# Patient Record
Sex: MALE | Race: WHITE
[De-identification: names, ages, dates, MRNs, and addresses within clinical notes are randomized per-mention and may not be internally consistent; named-entity substitution may affect disease eponyms.]

---

## 2017-04-22 ENCOUNTER — HOSPITAL ENCOUNTER (EMERGENCY)
Dept: HOSPITAL 17 - PHED | Age: 67
Discharge: HOME | End: 2017-04-22
Payer: MEDICARE

## 2017-04-22 VITALS — RESPIRATION RATE: 18 BRPM | OXYGEN SATURATION: 95 %

## 2017-04-22 VITALS — SYSTOLIC BLOOD PRESSURE: 138 MMHG | DIASTOLIC BLOOD PRESSURE: 64 MMHG

## 2017-04-22 VITALS — BODY MASS INDEX: 34.61 KG/M2 | WEIGHT: 233.69 LBS | HEIGHT: 69 IN

## 2017-04-22 VITALS — HEART RATE: 93 BPM | RESPIRATION RATE: 17 BRPM | OXYGEN SATURATION: 92 % | TEMPERATURE: 99 F

## 2017-04-22 DIAGNOSIS — J40: ICD-10-CM

## 2017-04-22 DIAGNOSIS — G89.4: ICD-10-CM

## 2017-04-22 DIAGNOSIS — Z87.891: Primary | ICD-10-CM

## 2017-04-22 LAB
ALP SERPL-CCNC: 100 U/L (ref 45–117)
ALT SERPL-CCNC: 26 U/L (ref 12–78)
ANION GAP SERPL CALC-SCNC: 9 MEQ/L (ref 5–15)
AST SERPL-CCNC: 23 U/L (ref 15–37)
BASE EXCESS BLD CALC-SCNC: 1.6 MMOL/L (ref -2–2)
BASOPHILS # BLD AUTO: 0 TH/MM3 (ref 0–0.2)
BASOPHILS NFR BLD: 0.7 % (ref 0–2)
BENZODIAZEPINES PNL UR: 92 % (ref 90–100)
BILIRUB SERPL-MCNC: 0.3 MG/DL (ref 0.2–1)
BLOOD GAS CARBOXYHEMOGLOBIN: 1.9 % (ref 0–4)
BLOOD GAS HCO3: 26 MMOL/L (ref 22–26)
BLOOD GAS OXYGEN CONTENT: 18.1 VOL % (ref 12–20)
BLOOD GAS PCO2: 43 MMHG (ref 38–42)
BUN SERPL-MCNC: 13 MG/DL (ref 7–18)
CHLORIDE SERPL-SCNC: 106 MEQ/L (ref 98–107)
COLOR UR: YELLOW
COMMENT (UR): (no result)
CRITICAL VALUE: NO
CULTURE IF INDICATED: (no result)
DRAW SITE: (no result)
EOSINOPHIL # BLD: 0.1 TH/MM3 (ref 0–0.4)
EOSINOPHIL NFR BLD: 1.2 % (ref 0–4)
ERYTHROCYTE [DISTWIDTH] IN BLOOD BY AUTOMATED COUNT: 13.3 % (ref 11.6–17.2)
GFR SERPLBLD BASED ON 1.73 SQ M-ARVRAT: 67 ML/MIN (ref 89–?)
GLUCOSE UR STRIP-MCNC: (no result) MG/DL
HCO3 BLD-SCNC: 27.3 MEQ/L (ref 21–32)
HCT VFR BLD CALC: 41.4 % (ref 39–51)
HEMO FLAGS: (no result)
HGB UR QL STRIP: (no result)
KETONES UR STRIP-MCNC: (no result) MG/DL
LYMPHOCYTES # BLD AUTO: 1.8 TH/MM3 (ref 1–4.8)
LYMPHOCYTES NFR BLD AUTO: 31.3 % (ref 9–44)
MCH RBC QN AUTO: 32.5 PG (ref 27–34)
MCHC RBC AUTO-ENTMCNC: 34.5 % (ref 32–36)
MCV RBC AUTO: 94.2 FL (ref 80–100)
METHGB MFR BLDA: 1.2 % (ref 0–2)
METHOD OF COLLECTION: (no result)
MONOCYTES NFR BLD: 5.8 % (ref 0–8)
NEUTROPHILS # BLD AUTO: 3.7 TH/MM3 (ref 1.8–7.7)
NEUTROPHILS NFR BLD AUTO: 61 % (ref 16–70)
NITRITE UR QL STRIP: (no result)
NUMBER OF ARTERIAL PUNCTURES: 1
O2/TOTAL GAS SETTING VFR VENT: 21 %
OXYGEN DEVICE: (no result)
PLATELET # BLD: 233 TH/MM3 (ref 150–450)
PO2 BLD: 78 MMHG (ref 61–120)
POTASSIUM SERPL-SCNC: 3.7 MEQ/L (ref 3.5–5.1)
RBC # BLD AUTO: 4.4 MIL/MM3 (ref 4.5–5.9)
RBC #/AREA URNS HPF: (no result) /HPF (ref 0–3)
SALICYLATES SERPL-MCNC: 14 G/DL (ref 12–16)
SODIUM SERPL-SCNC: 142 MEQ/L (ref 136–145)
SP GR UR STRIP: 1.02 (ref 1–1.03)
STAT: YES
TEMP CORR TO: 98.6
ULNAR PULSE: PRESENT
WBC # BLD AUTO: 5.9 TH/MM3 (ref 4–11)

## 2017-04-22 PROCEDURE — 99284 EMERGENCY DEPT VISIT MOD MDM: CPT

## 2017-04-22 PROCEDURE — 94664 DEMO&/EVAL PT USE INHALER: CPT

## 2017-04-22 PROCEDURE — 71275 CT ANGIOGRAPHY CHEST: CPT

## 2017-04-22 PROCEDURE — 85025 COMPLETE CBC W/AUTO DIFF WBC: CPT

## 2017-04-22 PROCEDURE — 71020: CPT

## 2017-04-22 PROCEDURE — 87804 INFLUENZA ASSAY W/OPTIC: CPT

## 2017-04-22 PROCEDURE — 85379 FIBRIN DEGRADATION QUANT: CPT

## 2017-04-22 PROCEDURE — 80053 COMPREHEN METABOLIC PANEL: CPT

## 2017-04-22 PROCEDURE — 82805 BLOOD GASES W/O2 SATURATION: CPT

## 2017-04-22 PROCEDURE — 36600 WITHDRAWAL OF ARTERIAL BLOOD: CPT

## 2017-04-22 PROCEDURE — 81001 URINALYSIS AUTO W/SCOPE: CPT

## 2017-04-22 PROCEDURE — 87040 BLOOD CULTURE FOR BACTERIA: CPT

## 2017-04-22 PROCEDURE — 93005 ELECTROCARDIOGRAM TRACING: CPT

## 2017-04-22 NOTE — RADHPO
EXAM DATE/TIME:  04/22/2017 16:06 

 

[facility] COMPARISON:     

No previous studies available for comparison.

 

 

INDICATIONS :     

Cough, congestion, fever for 4 days. Elevated D-Dimer.

                      

 

IV CONTRAST:     

80 cc Omnipaque 350 (iohexol) IV 

 

 

RADIATION DOSE:     

20.75 CTDIvol (mGy) 

 

 

MEDICAL HISTORY :     

Hypertension.  

 

SURGICAL HISTORY :      

None. 

 

ENCOUNTER:      

Initial

 

ACUITY:      

4 - 6 days

 

PAIN SCALE:      

0/10

 

LOCATION:       

Bilateral chest 

 

TECHNIQUE:     

Volumetric scanning of the chest was performed using a pulmonary embolism protocol MIP images were re
constructed.  Using automated exposure control and adjustment of the mA and/or kV according to patien
t size, radiation dose was kept as low as reasonably achievable to obtain optimal diagnostic quality 
images. 

 

FINDINGS:     

 

PULMONARY ARTERIES:

No filling defects are seen in the pulmonary arteries through the segmental level.

 

LUNGS:     

There is no consolidation or pneumothorax .  No concerning pulmonary nodule is visualized.

 

PLEURAE:     

There is no pleural thickening or pleural effusion.

 

MEDIASTINUM:     

There is good visualization of the great vessels of the middle mediastinum.  No evidence of mediastin
al or hilar adenopathy/mass.

 

CONCLUSION:     

The study is negative for pulmonary embolism.

 

 

 

 Az Copeland MD on April 22, 2017 at 16:29           

Board Certified Radiologist.

 This report was verified electronically.

## 2017-04-22 NOTE — RADHPO
EXAM DATE/TIME:  04/22/2017 15:11 

 

[facility] COMPARISON:     

No previous studies available for comparison.

 

                     

INDICATIONS :     

Cough and congestion for over one week. 

                     

 

MEDICAL HISTORY :     

None.          

 

SURGICAL HISTORY :     

None.   

 

ENCOUNTER:     

Initial                                        

 

ACUITY:     

1 week      

 

PAIN SCORE:     

3/10

 

LOCATION:     

Bilateral lower chest 

 

FINDINGS:     

PA and lateral views of the chest demonstrate the lungs to be symmetrically aerated without evidence 
of mass, infiltrate or effusion.  The cardiomediastinal contours are unremarkable.  Osseous structure
s are intact.

 

CONCLUSION:     The lungs are clear.

 

 

 

 Az Copeland MD on April 22, 2017 at 15:43           

Board Certified Radiologist.

 This report was verified electronically.

## 2017-04-22 NOTE — PD
HPI


Chief Complaint:  Respiratory Symptoms


Time Seen by Provider:  14:46


Travel History


International Travel<30 days:  No


Contact w/Intl Traveler<30days:  No


Traveled to known affect area:  No





History of Present Illness


HPI


Patient is a 66-year-old male with history of hypertension, anxiety, GERD who 

was brought to emergency room by his wife for evaluation of possible pneumonia.

  Patient reports that for the past 3 days, he has not been feeling well.  

Patient reports that he has been feeling tired, reports that he has had a 

productive cough.  Patient reports that he feels uncomfortable all over and 

reports increased pressures to her sinuses.  Patient's wife reports that last 

night, patient became short of breath, reports that it appeared that patient 

was struggling to gasp for air.  Reports that they were concerned and brought 

him to the [facility] today, reports that they were told to 

go directly to [place] emergency room for evaluation of possible pneumonia and 

for IV antibiotics and respiratory treatments.  Patient denies any sick 

contacts.  Patient reports that he did have the flu vaccine this year.  Patient 

with no recent travels.  





Patient does endorse that he was a past smoker, reports that he quit in 1998





[facility]


Past Medical History


Arthritis:  Yes (OSTEO)


Diminished Hearing:  No


Hypertension:  Yes


Pregnant?:  Not Pregnant





Past Surgical History


Abdominal Surgery:  Yes (HERNIA  1987,1993)





Social History


Alcohol Use:  No


Tobacco Use:  No


Substance Use:  No





Allergies-Medications


(Allergen,Severity, Reaction):  


Coded Allergies:  


     Nonsteroidal Anti-Inflammatory Agts (Verified  Allergy, Severe, 4/22/17)


     Augmentin (Verified  Allergy, Intermediate, DIARRHEA, 4/22/17)


Reported Meds & Prescriptions





Reported Meds & Active Scripts


Active


Medrol Dosepak (Methylprednisolone) 4 Mg Dspk 4 Mg PO AS DIRECTED


     Per Pharmacist direction


Proair Hfa 8.5 GM Inh (Albuterol Sulfate) 90 Mcg/Act Aer 2 Puff INH Q4-6H PRN


     108 mcg/actuation


Tramadol (Tramadol HCl) 50 Mg Tab 50 Mg PO Q6H PRN


Azithromycin 500 Mg Tab 500 Mg PO DAILY


Reported


Oxycontin (Oxycodone HCl) 30 Mg Tab 30 Mg PO Q12HR


Oxycodone (Oxycodone HCl) 5 Mg Cap 5 Mg PO Q6H PRN


Pantoprazole (Pantoprazole Sodium) 20 Mg Tab 20 Mg PO DAILY


Sertraline (Sertraline HCl) 100 Mg Tab 100 Mg PO DAILY


Diovan Hct (Valsartan-Hydrochlorothiazide) 320-12.5 Mg Tab 1 Tab PO DAILY


Protonix (Pantoprazole Sodium) 40 Mg Tab 40 Mg PO DAILY


Amlodipine (Amlodipine Besylate) 5 Mg Tab 5 Mg PO DAILY








Review of Systems


General / Constitutional:  Positive: Fever, Chills


Eyes:  No: Visual changes


HENT:  No: Headaches


Cardiovascular:  No: Chest Pain or Discomfort


Respiratory:  Positive: Cough, Shortness of Breath, Wheezing


Gastrointestinal:  No: Abdominal Pain


Genitourinary:  No: Dysuria


Musculoskeletal:  No: Pain


Skin:  No Rash


Neurologic:  No: Weakness


Psychiatric:  No: Depression


Endocrine:  No: Polydipsia


Hematologic/Lymphatic:  No: Easy Bruising





Physical Exam


Narrative


GENERAL: NAD, nontoxic


SKIN: Focused skin assessment warm/dry.


HEAD: Atraumatic. Normocephalic. 


EYES: Pupils equal and round. No scleral icterus. No injection or drainage. 


ENT: No nasal bleeding or discharge.  Mucous membranes pink and dry.


NECK: Trachea midline. No JVD. 


CARDIOVASCULAR: Regular rate and rhythm.  No murmur appreciated.


RESPIRATORY: No accessory muscle use. Clear to auscultation. Breath sounds 

equal bilaterally. 


GASTROINTESTINAL: Abdomen soft, non-tender, nondistended. Hepatic and splenic 

margins not palpable. 


MUSCULOSKELETAL: No obvious deformities. No clubbing.  No cyanosis.  No edema. 


NEUROLOGICAL: Awake and alert. No obvious cranial nerve deficits.  Motor 

grossly within normal limits. Normal speech.


PSYCHIATRIC: Appropriate mood and affect; insight and judgment normal.





Data


Data


Last Documented VS





Vital Signs








  Date Time  Temp Pulse Resp B/P Pulse Ox O2 Delivery O2 Flow Rate FiO2


 


4/22/17 15:55   18  95 Room Air  


 


4/22/17 15:03    138/64    


 


4/22/17 14:35 99.0 93      








Orders





 Electrocardiogram (4/22/17 14:54)


Complete Blood Count With Diff (4/22/17 14:54)


Comprehensive Metabolic Panel (4/22/17 14:54)


Influenzae A/B Antigen (4/22/17 14:54)


Urinalysis - C+S If Indicated (4/22/17 14:54)


Blood Culture (4/22/17 14:54)


Chest, Pa & Lat (4/22/17 14:54)


Ecg Monitoring (4/22/17 14:54)


Iv Access Insert/Monitor (4/22/17 14:54)


Oximetry (4/22/17 14:54)


Albuterol-Ipratropium Neb (Duoneb Neb) (4/22/17 15:00)


Sodium Chlor 0.9% 1000 Ml Inj (Ns 1000 M (4/22/17 15:00)


D-Dimer (4/22/17 14:59)


Arterial Blood Gas (Abg) (4/22/17 )


Ct Pulmonary Angiogram (4/22/17 15:56)


Iohexol 350 Inj (Omnipaque 350 Inj) (4/22/17 16:24)


Azithromycin (Zithromax) (4/22/17 16:45)





Labs





 Laboratory Tests








Test 4/22/17 4/22/17 4/22/17





 15:00 15:30 15:31


 


White Blood Count 5.9 TH/MM3  


 


Red Blood Count 4.40 MIL/MM3  


 


Hemoglobin 14.3 GM/DL  


 


Hematocrit 41.4 %  


 


Mean Corpuscular Volume 94.2 FL  


 


Mean Corpuscular Hemoglobin 32.5 PG  


 


Mean Corpuscular Hemoglobin 34.5 %  





Concent   


 


Red Cell Distribution Width 13.3 %  


 


Platelet Count 233 TH/MM3  


 


Mean Platelet Volume 7.3 FL  


 


Neutrophils (%) (Auto) 61.0 %  


 


Lymphocytes (%) (Auto) 31.3 %  


 


Monocytes (%) (Auto) 5.8 %  


 


Eosinophils (%) (Auto) 1.2 %  


 


Basophils (%) (Auto) 0.7 %  


 


Neutrophils # (Auto) 3.7 TH/MM3  


 


Lymphocytes # (Auto) 1.8 TH/MM3  


 


Monocytes # (Auto) 0.3 TH/MM3  


 


Eosinophils # (Auto) 0.1 TH/MM3  


 


Basophils # (Auto) 0.0 TH/MM3  


 


CBC Comment DIFF FINAL   


 


Differential Comment    


 


D-Dimer Quantitative (PE/DVT) 0.88 MG/L FEU  


 


Sodium Level 142 MEQ/L  


 


Potassium Level 3.7 MEQ/L  


 


Chloride Level 106 MEQ/L  


 


Carbon Dioxide Level 27.3 MEQ/L  


 


Anion Gap 9 MEQ/L  


 


Blood Urea Nitrogen 13 MG/DL  


 


Creatinine 1.10 MG/DL  


 


Estimat Glomerular Filtration 67 ML/MIN  





Rate   


 


Random Glucose 104 MG/DL  


 


Calcium Level 8.5 MG/DL  


 


Total Bilirubin 0.3 MG/DL  


 


Aspartate Amino Transf 23 U/L  





(AST/SGOT)   


 


Alanine Aminotransferase 26 U/L  





(ALT/SGPT)   


 


Alkaline Phosphatase 100 U/L  


 


Total Protein 7.1 GM/DL  


 


Albumin 3.8 GM/DL  


 


Urine Collection Type  CLEAN CATCH  


 


Urine Color  YELLOW  


 


Urine Turbidity  CLEAR  


 


Urine pH  6.0  


 


Urine Specific Gravity  1.020  


 


Urine Protein  NEG mg/dL 


 


Urine Glucose (UA)  NEG mg/dL 


 


Urine Ketones  NEG mg/dL 


 


Urine Occult Blood  NEG  


 


Urine Nitrite  NEG  


 


Urine Bilirubin  NEG  


 


Urine Leukocyte Esterase  NEG  


 


Urine RBC  0-3 /hpf 


 


Microscopic Urinalysis Comment  CULT NOT 





  INDICATED 


 


Blood Gas Puncture Site   LT BRACHIAL 


 


Blood Gas Patient Temperature   98.6 


 


Blood Gas HCO3   26 mmol/L


 


Blood Gas Base Excess   1.6 mmol/L


 


Blood Gas Oxygen Saturation   92 %


 


Arterial Blood pH   7.40 


 


Arterial Blood Partial   43 mmHG





Pressure CO2   


 


Arterial Blood Partial   78 mmHG





Pressure O2   


 


Arterial Blood Oxygen Content   18.1 Vol %


 


Arterial Blood   1.9 %





Carboxyhemoglobin   


 


Arterial Blood Methemoglobin   1.2 %


 


Blood Gas Hemoglobin   14.0 G/DL


 


Oxygen Delivery Device   NONE 


 


Blood Gas Inspired Oxygen   21 %











MDM


Medical Decision Making


Medical Screen Exam Complete:  Yes


Emergency Medical Condition:  Yes


Interpretation(s)


EKG at 1458: NSR at 80bpm, qt/qtc: 372/407, no acute st or t wave changes, non 

acute ekg





Vital Signs








  Date Time  Temp Pulse Resp B/P Pulse Ox O2 Delivery O2 Flow Rate FiO2


 


4/22/17 14:35 99.0 93 17  92   








Differential Diagnosis


Pneumonia, influenza, viral infection, PE, pneumothorax


Narrative Course


Patient is a 66-year-old male who presents to emergency room for evaluation of 

possible pneumonia.  Patient reports that he has been feeling sick for the past 

3 days with increased cough, congestion, fevers and chills.  Patient was seen 

at [facility] urgent care today, was transferred to the emergency room for 

evaluation of suspected left-sided pneumonia, dehydration.  Patient did not 

receive x-ray of the chest, lab work or antibiotics while at the urgent care 

center.  Patient here for full workup.





Patient hypoxic with a pulse ox of 92% on room air.  ABG as well as neb 

treatment ordered.  Patient was placed on a cardiac monitor as well as 

continuous pulse oximeter..  EKG ordered.  Labs including blood cultures Of the 

chest ordered to evaluate for possible signs of infection.  Plan to give IVF 

and monitor patient








Laboratory Tests








Test 4/22/17 4/22/17 4/22/17





 15:00 15:30 15:31


 


White Blood Count 5.9 TH/MM3  





 (4.0-11.0)  


 


Red Blood Count 4.40 MIL/MM3  





 (4.50-5.90)  


 


Hemoglobin 14.3 GM/DL  





 (13.0-17.0)  


 


Hematocrit 41.4 %  





 (39.0-51.0)  


 


Mean Corpuscular Volume 94.2 FL  





 (80.0-100.0)  


 


Mean Corpuscular Hemoglobin 32.5 PG  





 (27.0-34.0)  


 


Mean Corpuscular Hemoglobin 34.5 %  





Concent (32.0-36.0)  


 


Red Cell Distribution Width 13.3 %  





 (11.6-17.2)  


 


Platelet Count 233 TH/MM3  





 (150-450)  


 


Mean Platelet Volume 7.3 FL  





 (7.0-11.0)  


 


Neutrophils (%) (Auto) 61.0 %  





 (16.0-70.0)  


 


Lymphocytes (%) (Auto) 31.3 %  





 (9.0-44.0)  


 


Monocytes (%) (Auto) 5.8 % (0.0-8.0)  


 


Eosinophils (%) (Auto) 1.2 % (0.0-4.0)  


 


Basophils (%) (Auto) 0.7 % (0.0-2.0)  


 


Neutrophils # (Auto) 3.7 TH/MM3  





 (1.8-7.7)  


 


Lymphocytes # (Auto) 1.8 TH/MM3  





 (1.0-4.8)  


 


Monocytes # (Auto) 0.3 TH/MM3  





 (0-0.9)  


 


Eosinophils # (Auto) 0.1 TH/MM3  





 (0-0.4)  


 


Basophils # (Auto) 0.0 TH/MM3  





 (0-0.2)  


 


CBC Comment DIFF FINAL   


 


Differential Comment    


 


D-Dimer Quantitative (PE/DVT) 0.88 MG/L FEU  





 (0.00-0.50)  


 


Sodium Level 142 MEQ/L  





 (136-145)  


 


Potassium Level 3.7 MEQ/L  





 (3.5-5.1)  


 


Chloride Level 106 MEQ/L  





 ()  


 


Carbon Dioxide Level 27.3 MEQ/L  





 (21.0-32.0)  


 


Anion Gap 9 MEQ/L (5-15)  


 


Blood Urea Nitrogen 13 MG/DL (7-18)  


 


Creatinine 1.10 MG/DL  





 (0.60-1.30)  


 


Estimat Glomerular Filtration 67 ML/MIN (>89)  





Rate   


 


Random Glucose 104 MG/DL  





 ()  


 


Calcium Level 8.5 MG/DL  





 (8.5-10.1)  


 


Total Bilirubin 0.3 MG/DL  





 (0.2-1.0)  


 


Aspartate Amino Transf 23 U/L (15-37)  





(AST/SGOT)   


 


Alanine Aminotransferase 26 U/L (12-78)  





(ALT/SGPT)   


 


Alkaline Phosphatase 100 U/L  





 ()  


 


Total Protein 7.1 GM/DL  





 (6.4-8.2)  


 


Albumin 3.8 GM/DL  





 (3.4-5.0)  


 


Urine Collection Type  CLEAN CATCH  


 


Urine Color  YELLOW 





  (YELLW/STRAW) 


 


Urine Turbidity  CLEAR  (CLEAR) 


 


Urine pH  6.0  (5.0-8.5) 


 


Urine Specific Gravity  1.020 





  (1.002-1.035) 


 


Urine Protein  NEG mg/dL 





  (NEG-TRACE) 


 


Urine Glucose (UA)  NEG mg/dL (NEG) 


 


Urine Ketones  NEG mg/dL (NEG) 


 


Urine Occult Blood  NEG  (NEG) 


 


Urine Nitrite  NEG  (NEG) 


 


Urine Bilirubin  NEG  (NEG) 


 


Urine Leukocyte Esterase  NEG  (NEG) 


 


Urine RBC  0-3 /hpf (0-3) 


 


Microscopic Urinalysis Comment  CULT NOT 





  INDICATED 


 


Blood Gas Puncture Site   LT BRACHIAL 


 


Blood Gas Patient Temperature   98.6 


 


Blood Gas HCO3   26 mmol/L





   (22-26)


 


Blood Gas Base Excess   1.6 mmol/L





   (-2-2)


 


Blood Gas Oxygen Saturation   92 % ()


 


Arterial Blood pH   7.40





   (7.380-7.420)


 


Arterial Blood Partial   43 mmHG (38-42)





Pressure CO2   


 


Arterial Blood Partial   78 mmHG





Pressure O2   ()


 


Arterial Blood Oxygen Content   18.1 Vol %





   (12.0-20.0)


 


Arterial Blood   1.9 % (0-4)





Carboxyhemoglobin   


 


Arterial Blood Methemoglobin   1.2 % (0-2)


 


Blood Gas Hemoglobin   14.0 G/DL





   (12.0-16.0)


 


Oxygen Delivery Device   NONE 


 


Blood Gas Inspired Oxygen   21 %








Microbiology








 Date/Time Procedure Status





Source Growth 


 


 4/22/17 15:00 Aerobic Blood Culture Received





Blood Peripheral Pending 





 4/22/17 15:00 Anaerobic Blood Culture Received





Blood Peripheral Pending 


 


 4/22/17 15:00 Influenza Types A,B Antigen (PENNIE) - Final Complete





Nasal Washing NEGATIVE FOR FLU A AND B ANTIGEN.... 


 


 4/22/17 15:13 Aerobic Blood Culture Received





Blood Peripheral Pending 





 4/22/17 15:13 Anaerobic Blood Culture Received





Blood Peripheral Pending 








Last Impressions








Chest X-Ray 4/22/17 1454 Signed





Impressions: 





 Service Date/Time:  Saturday, April 22, 2017 15:11 - CONCLUSION: The lungs are 





 clear.     Az Copeland MD 








Patient with positive d-dimer, pts pulse ox 92% on room air - CTA ordered





Reviewed labs and studies with patient in detail, patient is agreeable to 

further testing.  








Last Impressions








Chest X-Ray 4/22/17 1454 Signed





Impressions: 





 Service Date/Time:  Saturday, April 22, 2017 15:11 - CONCLUSION: The lungs are 





 clear.     Az Copeland MD 








Microbiology








 Date/Time Procedure Status





Source Growth 


 


 4/22/17 15:00 Aerobic Blood Culture Received





Blood Peripheral Pending 





 4/22/17 15:00 Anaerobic Blood Culture Received





Blood Peripheral Pending 


 


 4/22/17 15:00 Influenza Types A,B Antigen (PENNIE) - Final Complete





Nasal Washing NEGATIVE FOR FLU A AND B ANTIGEN.... 


 


 4/22/17 15:13 Aerobic Blood Culture Received





Blood Peripheral Pending 





 4/22/17 15:13 Anaerobic Blood Culture Received





Blood Peripheral Pending 








CT of the chest shows no pulmonary embolism or pneumonia.  All labs and all 

studies reviewed in detail, patient with benign exam.  Patient most likely with 

acute bronchitis.  Patient will follow-up with primary care doctor and will 

return to emergency room as needed.  Patient requesting tramadol for his 

chronic pain, reports that he wishes to be taken off of Percocet and request a 

script for tramadol for his chronic pain "all over my body."  Patient will 

follow-up with his primary care doctor Monday for further script as he 

understands that the ER cannot manage his chronic pain





Diagnosis





 Primary Impression:  


 Bronchitis


 Additional Impression:  


 Chronic pain


 Qualified Code:  G89.4 - Chronic pain syndrome


Patient Instructions:  General Instructions





***Additional Instructions:


Please follow-up with your primary care doctor in 2-3 days





Return to emergency room if symptoms progress or worsen





Please return to emergency room as needed





Take all medications as prescribed





Please follow up with all cultures from today


***Med/Other Pt SpecificInfo:  Prescription(s) given


Scripts


Methylprednisolone Dosepak (Medrol Dosepak)4 Mg Dspk4 Mg PO AS DIRECTED  #1 

DSPK  Ref 0


   Per Pharmacist direction


   Prov:Anna Dhaliwal DO         4/22/17 


Albuterol 8.5 GM Inh (Proair Hfa 8.5 GM Inh)90 Mcg/Act Aer2 Puff INH Q4-6H PRN (

SHORTNESS OF BREATH) #1 INHALER  Ref 0


   108 mcg/actuation


   Prov:Anna Dhaliwal DO         4/22/17 


Tramadol 50 Mg Tab50 Mg PO Q6H PRN (PAIN) #20 TAB  Ref 0


   Prov:Anna Dhaliwal DO         4/22/17 


Azithromycin 500 Mg [id number] Mg PO DAILY  #5 TAB  Ref 0


   Prov:Anna Dhaliwal DO         4/22/17


Disposition:  01 DISCHARGE HOME


Condition:  Stable








Anna Dhaliwal DO Apr 22, 2017 15:06

## 2017-04-23 NOTE — EKG
Date Performed: 04/22/2017       Time Performed: 14:58:50

 

PTAGE:      66 years

 

EKG:      Sinus rhythm 

 

 Normal ECG 

 

NO PREVIOUS TRACING            

 

DOCTOR:   Yannick Rizvi  Interpretating Date/Time  04/23/2017 13:33:32

## 2017-05-16 ENCOUNTER — HOSPITAL ENCOUNTER (EMERGENCY)
Dept: HOSPITAL 17 - NEPC | Age: 67
Discharge: HOME | End: 2017-05-16
Payer: MEDICARE

## 2017-05-16 VITALS — BODY MASS INDEX: 31.56 KG/M2 | HEIGHT: 70 IN | WEIGHT: 220.46 LBS

## 2017-05-16 VITALS
HEART RATE: 98 BPM | RESPIRATION RATE: 18 BRPM | DIASTOLIC BLOOD PRESSURE: 64 MMHG | OXYGEN SATURATION: 100 % | SYSTOLIC BLOOD PRESSURE: 117 MMHG

## 2017-05-16 VITALS
TEMPERATURE: 99.1 F | SYSTOLIC BLOOD PRESSURE: 136 MMHG | OXYGEN SATURATION: 92 % | RESPIRATION RATE: 21 BRPM | DIASTOLIC BLOOD PRESSURE: 70 MMHG | HEART RATE: 99 BPM

## 2017-05-16 VITALS
RESPIRATION RATE: 16 BRPM | SYSTOLIC BLOOD PRESSURE: 104 MMHG | OXYGEN SATURATION: 97 % | HEART RATE: 70 BPM | DIASTOLIC BLOOD PRESSURE: 62 MMHG

## 2017-05-16 VITALS — OXYGEN SATURATION: 96 %

## 2017-05-16 DIAGNOSIS — F10.920: Primary | ICD-10-CM

## 2017-05-16 DIAGNOSIS — G89.29: ICD-10-CM

## 2017-05-16 DIAGNOSIS — I10: ICD-10-CM

## 2017-05-16 DIAGNOSIS — M54.5: ICD-10-CM

## 2017-05-16 LAB
ALP SERPL-CCNC: 85 U/L (ref 45–117)
ALT SERPL-CCNC: 28 U/L (ref 12–78)
ANION GAP SERPL CALC-SCNC: 11 MEQ/L (ref 5–15)
AST SERPL-CCNC: 25 U/L (ref 15–37)
BASOPHILS # BLD AUTO: 0.1 TH/MM3 (ref 0–0.2)
BASOPHILS NFR BLD: 1.3 % (ref 0–2)
BILIRUB SERPL-MCNC: 0.4 MG/DL (ref 0.2–1)
BUN SERPL-MCNC: 18 MG/DL (ref 7–18)
CHLORIDE SERPL-SCNC: 105 MEQ/L (ref 98–107)
COLOR UR: (no result)
COMMENT (UR): (no result)
CULTURE IF INDICATED: (no result)
EOSINOPHIL # BLD: 0.1 TH/MM3 (ref 0–0.4)
EOSINOPHIL NFR BLD: 1.7 % (ref 0–4)
ERYTHROCYTE [DISTWIDTH] IN BLOOD BY AUTOMATED COUNT: 13.5 % (ref 11.6–17.2)
GFR SERPLBLD BASED ON 1.73 SQ M-ARVRAT: 64 ML/MIN (ref 89–?)
GLUCOSE UR STRIP-MCNC: (no result) MG/DL
HCO3 BLD-SCNC: 25.1 MEQ/L (ref 21–32)
HCT VFR BLD CALC: 42 % (ref 39–51)
HEMO FLAGS: (no result)
HGB UR QL STRIP: (no result)
KETONES UR STRIP-MCNC: (no result) MG/DL
LYMPHOCYTES # BLD AUTO: 3.1 TH/MM3 (ref 1–4.8)
LYMPHOCYTES NFR BLD AUTO: 36.6 % (ref 9–44)
MCH RBC QN AUTO: 32.9 PG (ref 27–34)
MCHC RBC AUTO-ENTMCNC: 35 % (ref 32–36)
MCV RBC AUTO: 94.1 FL (ref 80–100)
MONOCYTES NFR BLD: 8 % (ref 0–8)
NEUTROPHILS # BLD AUTO: 4.5 TH/MM3 (ref 1.8–7.7)
NEUTROPHILS NFR BLD AUTO: 52.4 % (ref 16–70)
NITRITE UR QL STRIP: (no result)
PLATELET # BLD: 236 TH/MM3 (ref 150–450)
POTASSIUM SERPL-SCNC: 3.8 MEQ/L (ref 3.5–5.1)
RBC # BLD AUTO: 4.47 MIL/MM3 (ref 4.5–5.9)
SODIUM SERPL-SCNC: 141 MEQ/L (ref 136–145)
SP GR UR STRIP: 1 (ref 1–1.03)
WBC # BLD AUTO: 8.5 TH/MM3 (ref 4–11)

## 2017-05-16 PROCEDURE — 83690 ASSAY OF LIPASE: CPT

## 2017-05-16 PROCEDURE — 80307 DRUG TEST PRSMV CHEM ANLYZR: CPT

## 2017-05-16 PROCEDURE — 80053 COMPREHEN METABOLIC PANEL: CPT

## 2017-05-16 PROCEDURE — 99284 EMERGENCY DEPT VISIT MOD MDM: CPT

## 2017-05-16 PROCEDURE — 72131 CT LUMBAR SPINE W/O DYE: CPT

## 2017-05-16 PROCEDURE — 81001 URINALYSIS AUTO W/SCOPE: CPT

## 2017-05-16 PROCEDURE — 85025 COMPLETE CBC W/AUTO DIFF WBC: CPT

## 2017-05-16 NOTE — RADRPT
EXAM DATE/TIME:  05/16/2017 15:10 

 

[facility] COMPARISON:     

No previous studies available for comparison.

 

 

INDICATIONS :     

Low back pain.

                      

 

RADIATION DOSE:     

48.56 CTDIvol (mGy) 

 

 

 

MEDICAL HISTORY :     

Hypertension. Hernia. Carcinoma, prostate.

 

SURGICAL HISTORY :       

Lamenectomy.

 

ENCOUNTER:      

Initial

 

ACUITY:      

1 day

 

PAIN SCALE:      

8/10

 

LOCATION:       

Bilateral  lower back

 

TECHNIQUE:     

Volumetric scanning of the lumbar spine was performed.  Multiplanar reconstructions in the sagittal, 
coronal and oblique axial planes were performed.  Using automated exposure control and adjustment of 
the mA and/or kV according to patient size, radiation dose was kept as low as reasonably achievable t
o obtain optimal diagnostic quality images. 

 

FINDINGS:       

 

VERTEBRAE:     

Normal vertebral body height. No fracture seen. Advanced multilevel degenerative changes greatest at 
L3-4, L4-5 and L5-S1 levels.

 

ALIGNMENT:     

No evidence of subluxation. There is levocurvature.

 

 

T12-L1:  

The thecal sac has a normal diameter.  No evidence of disc bulge or protrusion.  The neural foramina 
are patent bilaterally.

 

L1-L2:    

The thecal sac has a normal diameter.  No evidence of disc bulge or protrusion.  The neural foramina 
are patent bilaterally.

 

L2-L3:    

The thecal sac has a normal diameter.  No evidence of disc bulge or protrusion.  The neural foramina 
are patent bilaterally.

 

L3-L4: 

Mild broad-based posterior disc osteophyte complex abuts the ventral thecal sac without canal stenosi
s. Bilateral laminectomies. Mild facet arthropathy. Mild neural foraminal narrowing bilaterally. 

 

L4-L5:    

Mild broad-based posterior disc osteophyte complex abuts the ventral thecal sac without canal stenosi
s. Bilateral laminectomies. Mild facet arthropathy.  Mild neural foraminal narrowing bilaterally.

 

L5-S1:    

Mild broad-based posterior disc osteophyte complex abuts the ventral thecal sac without canal stenosi
s. Mild facet arthropathy.  The neural foramina are patent bilaterally.

 

CONCLUSION:     

1. Mild broad-based posterior disc osteophyte complexes at L3-4, L4-5 and L5-S1 levels. No canal sten
osis.

2. Laminectomies L3-4 and L4-5 levels.

3. Advanced multilevel degenerative changes.

 

 

 

 Delano Alarcon MD on May 16, 2017 at 15:28           

Board Certified Radiologist.

 This report was verified electronically.

## 2017-05-16 NOTE — PD
HPI


Chief Complaint:  Alcohol/Drug Intoxication


Time Seen by Provider:  13:52


Travel History


International Travel<30 days:  No


Contact w/Intl Traveler<30days:  No


Traveled to known affect area:  No





History of Present Illness


HPI


66-year-old male complains of low back pain.  Patient was found intoxicated in  

the parking lot in front of oncology building this afternoon.  Patient 

responded to sternal rub by EMS.  Patient was brought to the ED for evaluation.

  Patient states that he drank wine this morning.  Patient denies any headache.

  Patient denies any chest pain or shortness of breath.  Patient denies 

abdominal pain.  Patient denies any nausea vomiting diarrhea.  Patient has 

history chronic back pain.  Patient denies any new injury.  Patient denies any 

focal weakness or numbness of the extremity.  Patient has history of prostate 

cancer and has been seen by urologist and radiation oncologist.  Patient was 

treated with prostatectomy robotic for adenocarcinoma of the prostate gland.  

Recently he has elevated PSA level.  Patient is scheduled for radiotherapy 

adjuvant hormonal therapy.





PFSH


Past Medical History


Arthritis:  Yes (OSTEO)


Diminished Hearing:  No


Hypertension:  Yes





Past Surgical History


Abdominal Surgery:  Yes (HERNIA  1987,1993)





Social History


Alcohol Use:  No


Tobacco Use:  No


Substance Use:  No





Allergies-Medications


(Allergen,Severity, Reaction):  


Coded Allergies:  


     Nonsteroidal Anti-Inflammatory Agts (Verified  Allergy, Severe, 5/16/17)


     Augmentin (Verified  Allergy, Intermediate, DIARRHEA, 5/16/17)


Reported Meds & Prescriptions





Reported Meds & Active Scripts


Active


Medrol Dosepak (Methylprednisolone) 4 Mg Dspk 4 Mg PO AS DIRECTED


     Per Pharmacist direction


Proair Hfa 8.5 GM Inh (Albuterol Sulfate) 90 Mcg/Act Aer 2 Puff INH Q4-6H PRN


     108 mcg/actuation


Tramadol (Tramadol HCl) 50 Mg Tab 50 Mg PO Q6H PRN


Azithromycin 500 Mg Tab 500 Mg PO DAILY


Reported


Oxycontin (Oxycodone HCl) 30 Mg Tab 30 Mg PO Q12HR


Oxycodone (Oxycodone HCl) 5 Mg Cap 5 Mg PO Q6H PRN


Pantoprazole (Pantoprazole Sodium) 20 Mg Tab 20 Mg PO DAILY


Sertraline (Sertraline HCl) 100 Mg Tab 100 Mg PO DAILY


Diovan Hct (Valsartan-Hydrochlorothiazide) 320-12.5 Mg Tab 1 Tab PO DAILY


Protonix (Pantoprazole Sodium) 40 Mg Tab 40 Mg PO DAILY


Amlodipine (Amlodipine Besylate) 5 Mg Tab 5 Mg PO DAILY








Review of Systems


General / Constitutional:  No: Fever


Eyes:  No: Visual changes


HENT:  No: Headaches


Cardiovascular:  No: Chest Pain or Discomfort


Respiratory:  No: Shortness of Breath


Gastrointestinal:  No: Abdominal Pain


Genitourinary:  No: Dysuria


Musculoskeletal:  No: Pain


Skin:  No Rash


Neurologic:  No: Weakness


Psychiatric:  No: Depression


Endocrine:  No: Polydipsia


Hematologic/Lymphatic:  No: Easy Bruising





Physical Exam


Narrative


GENERAL: Well-nourished, well-developed patient.


SKIN: Focused skin assessment warm/dry.


HEAD: Normocephalic.


EYES: No scleral icterus. No injection or drainage. 


NECK: Supple, trachea midline. No JVD or lymphadenopathy.


CARDIOVASCULAR: Regular rate and rhythm without murmurs, gallops, or rubs. 


RESPIRATORY: Breath sounds equal bilaterally. No accessory muscle use.


GASTROINTESTINAL: Abdomen soft, non-tender, nondistended. 


MUSCULOSKELETAL: No cyanosis, or edema. 


BACK: Moderate tenderness on palpation lumbar area, without obvious deformity. 

No CVA tenderness. 


Neurologic exam normal.





Data


Data


Last Documented VS





Vital Signs








  Date Time  Temp Pulse Resp B/P Pulse Ox O2 Delivery O2 Flow Rate FiO2


 


5/16/17 14:03     96 Nasal Cannula 2 


 


5/16/17 13:52 99.1 99 21 136/70    








Orders





 Complete Blood Count With Diff (5/16/17 13:58)


Comprehensive Metabolic Panel (5/16/17 13:58)


Lipase (5/16/17 13:58)


Urinalysis - C+S If Indicated (5/16/17 13:58)


Iv Access Insert/Monitor (5/16/17 13:58)


Ecg Monitoring (5/16/17 13:58)


Oximetry (5/16/17 13:58)


Alcohol (Ethanol) (5/16/17 13:58)


Ct Lumb Spine W/O Contrast (5/16/17 14:05)


Sodium Chlor 0.9% 1000 Ml Inj (Ns 1000 M (5/16/17 14:15)





Labs





 Laboratory Tests








Test 5/16/17 5/16/17





 14:00 15:30


 


White Blood Count 8.5 TH/MM3 


 


Red Blood Count 4.47 MIL/MM3 


 


Hemoglobin 14.7 GM/DL 


 


Hematocrit 42.0 % 


 


Mean Corpuscular Volume 94.1 FL 


 


Mean Corpuscular Hemoglobin 32.9 PG 


 


Mean Corpuscular Hemoglobin 35.0 % 





Concent  


 


Red Cell Distribution Width 13.5 % 


 


Platelet Count 236 TH/MM3 


 


Mean Platelet Volume 7.5 FL 


 


Neutrophils (%) (Auto) 52.4 % 


 


Lymphocytes (%) (Auto) 36.6 % 


 


Monocytes (%) (Auto) 8.0 % 


 


Eosinophils (%) (Auto) 1.7 % 


 


Basophils (%) (Auto) 1.3 % 


 


Neutrophils # (Auto) 4.5 TH/MM3 


 


Lymphocytes # (Auto) 3.1 TH/MM3 


 


Monocytes # (Auto) 0.7 TH/MM3 


 


Eosinophils # (Auto) 0.1 TH/MM3 


 


Basophils # (Auto) 0.1 TH/MM3 


 


CBC Comment DIFF FINAL  


 


Differential Comment   


 


Sodium Level 141 MEQ/L 


 


Potassium Level 3.8 MEQ/L 


 


Chloride Level 105 MEQ/L 


 


Carbon Dioxide Level 25.1 MEQ/L 


 


Anion Gap 11 MEQ/L 


 


Blood Urea Nitrogen 18 MG/DL 


 


Creatinine 1.15 MG/DL 


 


Estimat Glomerular Filtration 64 ML/MIN 





Rate  


 


Random Glucose 88 MG/DL 


 


Calcium Level 8.9 MG/DL 


 


Total Bilirubin 0.4 MG/DL 


 


Aspartate Amino Transf 25 U/L 





(AST/SGOT)  


 


Alanine Aminotransferase 28 U/L 





(ALT/SGPT)  


 


Alkaline Phosphatase 85 U/L 


 


Total Protein 7.2 GM/DL 


 


Albumin 3.8 GM/DL 


 


Lipase 184 U/L 


 


Ethyl Alcohol Level 295 MG/DL 


 


Urine Color  LIGHT-YELLOW 


 


Urine Turbidity  CLEAR 


 


Urine pH  6.0 


 


Urine Specific Gravity  1.003 


 


Urine Protein  NEG mg/dL


 


Urine Glucose (UA)  NEG mg/dL


 


Urine Ketones  NEG mg/dL


 


Urine Occult Blood  NEG 


 


Urine Nitrite  NEG 


 


Urine Bilirubin  NEG 


 


Urine Urobilinogen  LESS THAN 2.0





  MG/DL


 


Urine Leukocyte Esterase  NEG 


 


Microscopic Urinalysis Comment  CULT NOT





  INDICATED











MDM


Medical Decision Making


Medical Screen Exam Complete:  Yes


Emergency Medical Condition:  Yes


Medical Record Reviewed:  Yes


Interpretation(s)





Last Impressions








Lumbar Spine CT 5/16/17 5505 Signed





Impressions: 





 Service Date/Time:  Tuesday, May 16, 2017 15:10 - CONCLUSION:  1. Mild 





 broad-based posterior disc osteophyte complexes at L3-4, L4-5 and L5-S1 

levels. 





 No canal stenosis. 2. Laminectomies L3-4 and L4-5 levels. 3. Advanced 

multilevel 





 degenerative changes.     Delano Alarcon MD 





1600 p.m.  CBC within normal limit.  CMP within normal limit.  Alcohol 295.  UA 

is negative.


Differential Diagnosis


Differential diagnosis including alcohol intoxication, dehydration, electrolyte 

imbalance, drug induced mood disorder.


Narrative Course


66-year-old male was found intoxicated in a parking lot.  History of prostate 

cancer.  Normal saline solution 1 25 cc an hour.





Diagnosis





 Primary Impression:  


 Alcohol intoxication


 Qualified Code:  F10.920 - Alcohol intoxication, uncomplicated


 Additional Impression:  


 Acute exacerbation of chronic low back pain


Patient Instructions:  General Instructions





***Additional Instructions:


Advised [facility].  Follow-up with personal physician.  Return as 

needed.


***Med/Other Pt SpecificInfo:  No Change to Meds


Disposition:  01 DISCHARGE HOME


Condition:  Stable








Bang Armenta MD May 16, 2017 14:13

## 2018-03-25 ENCOUNTER — HOSPITAL ENCOUNTER (EMERGENCY)
Dept: HOSPITAL 17 - PHED | Age: 68
Discharge: HOME | End: 2018-03-25
Payer: MEDICARE

## 2018-03-25 VITALS
DIASTOLIC BLOOD PRESSURE: 54 MMHG | TEMPERATURE: 98.2 F | HEART RATE: 98 BPM | RESPIRATION RATE: 20 BRPM | SYSTOLIC BLOOD PRESSURE: 100 MMHG | OXYGEN SATURATION: 96 %

## 2018-03-25 DIAGNOSIS — G89.29: Primary | ICD-10-CM

## 2018-03-25 DIAGNOSIS — Z88.0: ICD-10-CM

## 2018-03-25 DIAGNOSIS — I10: ICD-10-CM

## 2018-03-25 DIAGNOSIS — Z88.8: ICD-10-CM

## 2018-03-25 DIAGNOSIS — F10.129: ICD-10-CM

## 2018-03-25 DIAGNOSIS — M19.90: ICD-10-CM

## 2018-03-25 DIAGNOSIS — R00.0: ICD-10-CM

## 2018-03-25 PROCEDURE — 96372 THER/PROPH/DIAG INJ SC/IM: CPT

## 2018-03-25 NOTE — PD
HPI


Chief Complaint:  Alcohol/Drug Intoxication


Time Seen by Provider:  15:07


Travel History


International Travel<30 days:  No


Contact w/Intl Traveler<30days:  No


Traveled to known affect area:  No





History of Present Illness


HPI


67-year-old male arrives by EMS after he fell while attempting to get into his 

car today.  He drank alcohol today.  He has chronic back pain reports and not 

taking his oxycodone today.  His complaints include request for opioids and 

ride home.  RN conversation with patient's wife significant for positive opioid 

ingestion today. Pt reports drinking about 1 pint of liquor today.





PFSH


Past Medical History


Arthritis:  Yes (OSTEO)


Diminished Hearing:  No


Hypertension:  Yes


Pregnant?:  Not Pregnant





Past Surgical History


Abdominal Surgery:  Yes (HERNIA  1987,1993)





Social History


Alcohol Use:  Yes


Tobacco Use:  No


Substance Use:  No





Allergies-Medications


(Allergen,Severity, Reaction):  


Coded Allergies:  


     diclofenac (Unverified  Allergy, Severe, 3/25/18)


     etodolac (Unverified  Allergy, Severe, 3/25/18)


     flurbiprofen (Unverified  Allergy, Severe, 3/25/18)


     ibuprofen (Unverified  Allergy, Severe, 3/25/18)


     indomethacin (Unverified  Allergy, Severe, 3/25/18)


     ketoprofen (Unverified  Allergy, Severe, 3/25/18)


     ketorolac (Unverified  Allergy, Severe, 3/25/18)


     naproxen (Unverified  Allergy, Severe, 3/25/18)


     oxaprozin (Unverified  Allergy, Severe, 3/25/18)


     amoxicillin (Unverified  Allergy, Intermediate, DIARRHEA, 3/25/18)


     clavulanic acid (Unverified  Allergy, Intermediate, DIARRHEA, 3/25/18)


Reported Meds & Prescriptions





Reported Meds & Active Scripts


Active


Active Prescriptions or Reported Medications Unobtainable    








Review of Systems


Except as stated in HPI:  all other systems reviewed are Neg


General / Constitutional:  No: Fever


Cardiovascular:  No: Chest Pain or Discomfort, Palpitations


Respiratory:  No: Cough, Shortness of Breath


Gastrointestinal:  No: Nausea, Vomiting, Abdominal Pain





Physical Exam


Narrative


GENERAL: 67-year-old male, etoh on breath, somewhat uncooperative





Vital Signs








  Date Time  Temp Pulse Resp B/P (MAP) Pulse Ox O2 Delivery O2 Flow Rate FiO2


 


3/25/18 15:09 98.2 98 20 100/54 (69) 96   








SKIN: Warm and dry.


HEAD: Atraumatic. Normocephalic. 


EYES: Pupils equal and round. No scleral icterus. No injection or drainage. 


ENT: No nasal bleeding or discharge.  Mucous membranes pink and moist.


NECK: Trachea midline. No JVD. 


CARDIOVASCULAR: Tachycardia to about 90s. Regular rhythm. 


RESPIRATORY: No accessory muscle use. Clear to auscultation. Breath sounds 

equal bilaterally. 


GASTROINTESTINAL: Abdomen soft, non-tender, nondistended. Hepatic and splenic 

margins not palpable. 


MUSCULOSKELETAL: Extremities without clubbing, cyanosis, or edema. No obvious 

deformities.  


NEUROLOGICAL: EtOH intoxication. Uncooperative. Moving all extremities 

normally. Answers questions at times.


PSYCHIATRIC: EtOH intoxication. No sign hallucinations. Normal attire.





Data


Data


Last Documented VS





Vital Signs








  Date Time  Temp Pulse Resp B/P (MAP) Pulse Ox O2 Delivery O2 Flow Rate FiO2


 


3/25/18 15:09 98.2 98 20 100/54 (69) 96   








Orders





 Orders


Promethazine Inj (Phenergan Inj) (3/25/18 16:00)


Ed Discharge Order (3/25/18 17:58)








MDM


Medical Decision Making


Medical Screen Exam Complete:  Yes


Emergency Medical Condition:  Yes


Medical Record Reviewed:  Yes


Differential Diagnosis


Alcohol intoxication, acute on chronic pain, chronic pain, malingering


Narrative Course


Pt arrives intoxicated with alcohol. 


His wife agreed to pick him up and take him home. 


Pt remained sitting upright on the stretcher with legs on the floor for most of 

his stay. 


Pt is sufficiently clinically sober for discharge with adult chaperone.





Diagnosis





 Primary Impression:  


 Chronic pain


 Qualified Codes:  G89.29 - Other chronic pain


 Additional Impression:  


 Alcohol intoxication


 Qualified Codes:  F10.929 - Alcohol use, unspecified with intoxication, 

unspecified


Scripts


Unable to Obtain Active Prescriptions or Reported Meds


Disposition:  01 DISCHARGE HOME


Condition:  Stable











Jason Mcpherson MD Mar 25, 2018 15:34